# Patient Record
Sex: FEMALE | Race: WHITE | NOT HISPANIC OR LATINO | Employment: OTHER | URBAN - METROPOLITAN AREA
[De-identification: names, ages, dates, MRNs, and addresses within clinical notes are randomized per-mention and may not be internally consistent; named-entity substitution may affect disease eponyms.]

---

## 2017-06-22 ENCOUNTER — TRANSCRIBE ORDERS (OUTPATIENT)
Dept: SLEEP CENTER | Facility: CLINIC | Age: 72
End: 2017-06-22

## 2017-06-22 ENCOUNTER — HOSPITAL ENCOUNTER (OUTPATIENT)
Dept: SLEEP CENTER | Facility: CLINIC | Age: 72
Discharge: HOME | End: 2017-06-22
Payer: MEDICARE

## 2017-06-22 DIAGNOSIS — G47.33 OBSTRUCTIVE SLEEP APNEA (ADULT) (PEDIATRIC): ICD-10-CM

## 2017-06-22 DIAGNOSIS — G47.33 OBSTRUCTIVE SLEEP APNEA (ADULT) (PEDIATRIC): Primary | ICD-10-CM

## 2018-06-28 ENCOUNTER — OFFICE VISIT (OUTPATIENT)
Dept: SLEEP CENTER | Facility: CLINIC | Age: 73
End: 2018-06-28
Payer: MEDICARE

## 2018-06-28 ENCOUNTER — TELEPHONE (OUTPATIENT)
Dept: SLEEP CENTER | Facility: CLINIC | Age: 73
End: 2018-06-28

## 2018-06-28 VITALS — HEIGHT: 64 IN | BODY MASS INDEX: 39.95 KG/M2 | HEART RATE: 60 BPM | WEIGHT: 234 LBS

## 2018-06-28 DIAGNOSIS — E66.9 OBESITY (BMI 30-39.9): ICD-10-CM

## 2018-06-28 DIAGNOSIS — E03.9 HYPOTHYROIDISM (ACQUIRED): ICD-10-CM

## 2018-06-28 DIAGNOSIS — M79.89 LEG SWELLING: ICD-10-CM

## 2018-06-28 DIAGNOSIS — G47.33 OBSTRUCTIVE SLEEP APNEA: Primary | ICD-10-CM

## 2018-06-28 DIAGNOSIS — IMO0002 SLEEP-RELATED HYPOVENTILATION: ICD-10-CM

## 2018-06-28 PROCEDURE — 99214 OFFICE O/P EST MOD 30 MIN: CPT | Performed by: INTERNAL MEDICINE

## 2018-06-28 RX ORDER — HYDROCHLOROTHIAZIDE 25 MG/1
TABLET ORAL
COMMUNITY
Start: 2018-04-05

## 2018-06-28 RX ORDER — LEVOTHYROXINE SODIUM 88 UG/1
TABLET ORAL
COMMUNITY

## 2018-06-28 RX ORDER — HYALURONATE SODIUM 10 MG/ML
4 SYRINGE (ML) INTRAARTICULAR
COMMUNITY

## 2018-06-28 RX ORDER — FUROSEMIDE 20 MG/1
40 TABLET ORAL
COMMUNITY

## 2018-06-28 NOTE — PROGRESS NOTES
Review of Systems      Genitourinary post menopausal (no peroids)   Cardiology ankle/leg swelling   Gastrointestinal none   Neurology none   Constitutional none   Integumentary none   Psychiatry none   Musculoskeletal joint pain   Pulmonary none   ENT none   Endocrine none   Hematological none

## 2018-06-28 NOTE — PROGRESS NOTES
Follow-Up Note - Sleep Center   Pantera Monroe  74 y o  female  UHT:3/34/[birth year]243  YUX:[id number]    CC: I saw this patient for follow-up in clinic today for her Sleep Disordered Breathing, Coexisting Sleep and Medical Problems  PFSH, Problem List, Medications & Allergies were reviewed in EMR  Interval changes: [none reported ]   She  has no past medical history on file  She has a current medication list which includes the following prescription(s): b complex vitamins, vitamin d3, furosemide, hydrochlorothiazide, levothyroxine, multiple vitamins-minerals, and sodium hyaluronate  ROS: Reviewed (see attached)  Jennifer Griffin HPI:  With respect to sleep disordered breathing, compliance data download shows:  No data was available, but she reports regular use for > 4hours   Stephanie Kylah reports  · no  difficulty tolerating PAP;   · no  adverse effects:   · Benefitting from use: sleeping better     Sleep Routine:  She reports getting 8 hours; she  has no difficulty initiating or maintaining sleep   She awakens spontaneously feeling refreshed  She denies excessive drowsiness but would doze off in the evenings if sedentary  Sonja Wu rated herself at Total score: 5 /24 on the [place] sleepiness scale ]    Habits:[ reports that she has never smoked  She has never used smokeless tobacco ], [ reports that she does not drink alcohol ], [ reports that she does not use drugs  ], Caffeine use: moderate , Exercise routine:  She is unable to because of degenerative joint disease of the knees  EXAM: Pulse 60   Ht 5' 4" (1 626 m)   Wt 106 kg (234 lb)   BMI 40 17 kg/m²      Patient is alert, orientated, cooperative [and in no distress]  Mental state [appears normal]  Craniofacial anatomy normal  There are [no] facial pressure marks or rashes  Neck Circumference: 43 cm  There are no abnormal neck masses   Nasal airway is [patent ]  Mucous membranes appeared normal  The oral airway [is crowded ] Base of tongue is at Mallampati class IV (only hard palate visible)  [Apart from truncal obesity,] the rest of exam (Heart, Lungs, Abdomen, CNS and Musculoskeletal systems) was unremarkable   IMPRESSION:   1  Obstructive sleep apnea  Sleep F/U  - established patient    Cpap DME   2  Sleep-related hypoventilation     3  Obesity (BMI 30-39 9)     4  Leg swelling     5  Hypothyroidism (acquired)       PLAN:  1  I reviewed results of the Sleep studies with the patient:  Her diagnostic study in 2009 demonstrated an AHI of 54 per hour, higher during REM  Minimum oxygen saturation was 68%  During a subsequent therapeutic study, she required 13 cm H2 O to remediated sleep disordered breathing  2  Treatment with nasal CPAP is medically necessary and she is agreeable to continue use  3  The patient's current unit has now reached its five-year reasonable useful life and needs to be replaced  4  Instruction on care of equipment and strategies to improve comfort with use of PAP were discussed  5  Care coordinated with DME provider and Rx to replace supplies provided  6  Pressure setting:  Continue at 10-16 cm H2O     7  Compliance with therapy was emphasizedcand strategies for weight reduction discussed  8  With your consent, follow-up is advised in 2 months [to monitor progress, compliance and to adjust therapy]  Thank you for allowing me to participate in the care of this patient      Sincerely,    Authenticated electronically by Paul Montenegro MD on 72/58/45   Board Certified Specialist

## 2018-08-28 ENCOUNTER — OFFICE VISIT (OUTPATIENT)
Dept: SLEEP CENTER | Facility: CLINIC | Age: 73
End: 2018-08-28
Payer: MEDICARE

## 2018-08-28 VITALS — SYSTOLIC BLOOD PRESSURE: 120 MMHG | DIASTOLIC BLOOD PRESSURE: 90 MMHG | HEIGHT: 64 IN

## 2018-08-28 DIAGNOSIS — R53.83 OTHER FATIGUE: ICD-10-CM

## 2018-08-28 DIAGNOSIS — E66.9 OBESITY (BMI 30-39.9): ICD-10-CM

## 2018-08-28 DIAGNOSIS — M79.89 LEG SWELLING: ICD-10-CM

## 2018-08-28 DIAGNOSIS — G47.33 OBSTRUCTIVE SLEEP APNEA: Primary | ICD-10-CM

## 2018-08-28 DIAGNOSIS — G47.10 HYPERSOMNIA: ICD-10-CM

## 2018-08-28 DIAGNOSIS — IMO0002 SLEEP-RELATED HYPOVENTILATION: ICD-10-CM

## 2018-08-28 DIAGNOSIS — I10 ESSENTIAL HYPERTENSION: ICD-10-CM

## 2018-08-28 DIAGNOSIS — F51.12 INSUFFICIENT SLEEP SYNDROME: ICD-10-CM

## 2018-08-28 PROCEDURE — 99214 OFFICE O/P EST MOD 30 MIN: CPT | Performed by: INTERNAL MEDICINE

## 2018-08-28 NOTE — PROGRESS NOTES
Review of Systems      Genitourinary post menopausal (no peroids)   Cardiology ankle/leg swelling   Gastrointestinal none   Neurology none   Constitutional fatigue   Integumentary none   Psychiatry none   Musculoskeletal joint pain   Pulmonary none   ENT none   Endocrine none   Hematological none

## 2018-08-28 NOTE — PROGRESS NOTES
Follow-Up Note - Sleep Center   Pantera Monroe  74 y o  female  UMT:6/11/0791  [facility]:[id number]    CC: I saw this patient for follow-up in clinic today for her Sleep Disordered Breathing, Coexisting Sleep and Medical Problems  She got a replacement CPAP machine around 2 months ago  She reports increased fatigue  PFSH, Problem List, Medications & Allergies were reviewed in EMR  Interval changes: [none reported ]   She  has no past medical history on file  She has a current medication list which includes the following prescription(s): b complex vitamins, vitamin d3, furosemide, hydrochlorothiazide, levothyroxine, multiple vitamins-minerals, and sodium hyaluronate  ROS: Reviewed (see attached)  HPI:  With respect to compliance, data download shows in the past 5 nights (prior to that she was using her old machine):  using PAP > 4 hours/night 100% of the time  HARESH (estimated) 0 9/hour at [90th percentile] pressure of 11 8cm H2O  Stephanie Kylah reports  · no  difficulty tolerating PAP;   · no  adverse effects:   · Benefitting from use: sleeping better     Sleep Routine: She reports getting 6-1/2 to 7 hours sleep; she has no difficulty initiating or maintaining sleep   She awakens spontaneously feeling refreshed  She denied excessive drowsiness no but uses caffeine to stay alert and naps for approximately an hour before getting to bed and using CPAP  She rated herself at Total score: 3 /24 on the [place] sleepiness scale  Habits:[ reports that she has never smoked  She has never used smokeless tobacco ], [ reports that she does not drink alcohol ], [ reports that she does not use drugs  ], Caffeine use: moderate, Exercise routine: none  EXAM: /90   Ht 5' 4" (1 626 m)      Patient is alert, orientated, cooperative [and in no distress]  Mental state [appears normal]  Craniofacial anatomy normal  There are [no] facial pressure marks or rashes  Neck Circumference: 43 cm   There are no abnormal neck masses  Nasal airway is [patent ]  Mucous membranes appeared normal  The oral airway [is crowded ]  She has bilateral 2+ pedal edema  [Apart from truncal obesity,] the rest of exam (Heart, Lungs, Abdomen, CNS and Musculoskeletal systems) was unremarkable   IMPRESSION:   1  Obstructive sleep apnea  Cpap DME   2  Sleep-related hypoventilation     3  Insufficient sleep syndrome     4  Hypersomnia     5  Other fatigue     6  Obesity (BMI 30-39 9)     7  Leg swelling     8  Essential hypertension       PLAN:  1  Treatment with  PAP is medically necessary and Becca James is agreable to continue use  2  We discussed how to improve comfort using PAP, care of equipment, and importance of compliance with therapy  3  H2O Pressure settin-14 cm   4  Rx provided to replace supplies and Care coordinated with DME provider  5  Strategies for weight reduction were discussed  6  I also advised increasing the amount of sleep that she gets  7  Follow-up is advised in [1 Alberto Lyons [or sooner if needed] [to monitor progress, compliance and to adjust therapy]  Thank you for allowing me to participate in the care of this patient      Sincerely,    Authenticated electronically by Paul Montenegro MD on 15/09/60   Board Certified Specialist

## 2019-08-27 ENCOUNTER — OFFICE VISIT (OUTPATIENT)
Dept: SLEEP CENTER | Facility: CLINIC | Age: 74
End: 2019-08-27
Payer: MEDICARE

## 2019-08-27 VITALS
DIASTOLIC BLOOD PRESSURE: 80 MMHG | BODY MASS INDEX: 40.29 KG/M2 | SYSTOLIC BLOOD PRESSURE: 118 MMHG | HEIGHT: 64 IN | HEART RATE: 64 BPM | WEIGHT: 236 LBS

## 2019-08-27 DIAGNOSIS — E66.9 OBESITY (BMI 30-39.9): ICD-10-CM

## 2019-08-27 DIAGNOSIS — G47.33 OBSTRUCTIVE SLEEP APNEA: Primary | ICD-10-CM

## 2019-08-27 DIAGNOSIS — I10 ESSENTIAL HYPERTENSION: ICD-10-CM

## 2019-08-27 DIAGNOSIS — G47.10 HYPERSOMNIA: ICD-10-CM

## 2019-08-27 DIAGNOSIS — F51.12 INSUFFICIENT SLEEP SYNDROME: ICD-10-CM

## 2019-08-27 DIAGNOSIS — IMO0002 SLEEP-RELATED HYPOVENTILATION: ICD-10-CM

## 2019-08-27 PROCEDURE — 99214 OFFICE O/P EST MOD 30 MIN: CPT | Performed by: INTERNAL MEDICINE

## 2019-08-27 NOTE — PROGRESS NOTES
Review of Systems      Genitourinary post menopausal (no peroids)   Cardiology ankle/leg swelling   Gastrointestinal none   Neurology none   Constitutional none   Integumentary none   Psychiatry none   Musculoskeletal none   Pulmonary none   ENT none   Endocrine none   Hematological none

## 2019-08-27 NOTE — PATIENT INSTRUCTIONS

## 2019-08-27 NOTE — PROGRESS NOTES
Follow-Up Note - Sleep Center   Gómez Phillips  66 y o  female  SAF:1/11/[birth year]66  HAX:[id number]    CC: I saw this patient for follow-up in clinic today for her Sleep Disordered Breathing, Coexisting Sleep and Medical Problems  PFSH, Problem List, Medications & Allergies were reviewed in EMR  Interval changes: none reported  She  has no past medical history on file  She has a current medication list which includes the following prescription(s): b complex vitamins, vitamin d3, furosemide, hydrochlorothiazide, levothyroxine, multiple vitamins-minerals, and sodium hyaluronate  ROS: Reviewed (see attached)  DATA REVIEWED:  using PAP > 4 hours/night 100% of the time  Estimated HARESH 1 6/hour at pressure of 12 4cm H2O @90th percentile  SUBJECTIVE: Regarding use of PAP, Lilia Boyer reports:   · She is experiencing no  adverse effects:   · She is   benefiting from use: sleeping better   Sleep Routine: She reports getting 6-1/2 hrs sleep in bed and naps for approximately an hour on the counter; she has no difficulty initiating or maintaining sleep   She awakens spontaneously and feels refreshed  She denied excessive drowsiness   She rated herself at Total score: 2 /24 on the [place] sleepiness scale  Habits: reports that she has never smoked  She has never used smokeless tobacco ,  reports that she does not drink alcohol ,  reports that she does not use drugs  , Caffeine use: very little , Exercise routine: none in because of knee pain  OBJECTIVE: /80   Pulse 64   Ht 5' 4" (1 626 m)   Wt 107 kg (236 lb)   BMI 40 51 kg/m²    Constitutional: Patient is well groomed; well appearing  Skin/Extrem: warm & dry; col & hydration normal; no edema  Psych: cooperativeand in no distress  Mental State appears normal   CNS: Alert, orientated, clear & coherent speech  H&N: EOMI; strabismus; NC/AT:no facial pressure marks, no rashes  Neck Circumference: 17"  ENMT Mucus membranes normal Nasal airway:patent  Oral airway: crowded  Resp:effort is normal CVS: RRR ABD:truncal obesity MSK:Gait ambulant with a cane    ASSESSMENT: Primary Sleep/Secondary(to Medical or Psych conditions) & comorbidities   1  Obstructive sleep apnea     2  Sleep-related hypoventilation     3  Insufficient sleep syndrome     4  Hypersomnia     5  Obesity (BMI 30-39 9)     6  Essential hypertension       PLAN:  1  Treatment with  PAP is medically necessary and Jose Flannery is agreable to continue use  2  Care of equipment, methods to improve comfort using PAP and importance of compliance with therapy were discussed  3  Pressure setting: continue 11-14 cmH2O     4  Rx provided to replace supplies and Care coordinated with DME provider  5  Strategies for weight reduction were discussed  6  I advised an earlier bedtime, to avoid dozing off on the couch and to use CPAP during her entire sleep  7  Follow-up is advised in 1 year or sooner if needed to monitor progress, compliance and to adjust therapy  Thank you for allowing me to participate in the care of this patient      Sincerely,    Authenticated electronically by Parth Sinclair MD on 91/73/64   Board Certified Specialist

## 2019-09-06 ENCOUNTER — TELEPHONE (OUTPATIENT)
Dept: SLEEP CENTER | Facility: CLINIC | Age: 74
End: 2019-09-06

## 2020-09-22 ENCOUNTER — OFFICE VISIT (OUTPATIENT)
Dept: SLEEP CENTER | Facility: CLINIC | Age: 75
End: 2020-09-22
Payer: MEDICARE

## 2020-09-22 VITALS
OXYGEN SATURATION: 97 % | DIASTOLIC BLOOD PRESSURE: 78 MMHG | WEIGHT: 234 LBS | HEART RATE: 71 BPM | BODY MASS INDEX: 38.99 KG/M2 | HEIGHT: 65 IN | SYSTOLIC BLOOD PRESSURE: 130 MMHG

## 2020-09-22 DIAGNOSIS — G47.10 HYPERSOMNIA: ICD-10-CM

## 2020-09-22 DIAGNOSIS — IMO0002 SLEEP-RELATED HYPOVENTILATION: ICD-10-CM

## 2020-09-22 DIAGNOSIS — G47.33 OBSTRUCTIVE SLEEP APNEA: Primary | ICD-10-CM

## 2020-09-22 DIAGNOSIS — I10 ESSENTIAL HYPERTENSION: ICD-10-CM

## 2020-09-22 DIAGNOSIS — F51.12 INSUFFICIENT SLEEP SYNDROME: ICD-10-CM

## 2020-09-22 DIAGNOSIS — E66.9 OBESITY (BMI 30-39.9): ICD-10-CM

## 2020-09-22 PROCEDURE — 99214 OFFICE O/P EST MOD 30 MIN: CPT | Performed by: INTERNAL MEDICINE

## 2020-09-22 RX ORDER — METOLAZONE 5 MG/1
5 TABLET ORAL DAILY
COMMUNITY

## 2020-09-22 RX ORDER — DOXYCYCLINE 100 MG/1
100 CAPSULE ORAL DAILY
COMMUNITY
Start: 2020-09-15

## 2020-09-22 NOTE — PROGRESS NOTES
Review of Systems      Genitourinary none   Cardiology ankle/leg swelling   Gastrointestinal none   Neurology none   Constitutional none   Integumentary none   Psychiatry none   Musculoskeletal none   Pulmonary none   ENT none   Endocrine none   Hematological none

## 2020-09-22 NOTE — PROGRESS NOTES
Follow-Up Note - Sleep Center   Colin Calles  66 y o  female  UNW:9/99/3410  MELBA:[id number]    CC: I saw this patient for follow-up in clinic today for Sleep disordered breathing, Coexisting Sleep and Medical Problems  Results of prior studies: The diagnostic study in 2009 demonstrated an AHI of 54 per hour, higher during REM and while supine  Minimum oxygen saturation was 68%  During her last titration study in 2014, sleep disordered breathing was adequately remediated with nasal CPAP at 10 cm H2O  (Prior titration study demonstrated higher pressure requirement and machine was set in auto titrating mode )    PFSH, Problem List, Medications & Allergies were reviewed in EMR  Interval changes:  Recent cellulitis as a complication of venous insufficiency that cause swelling of her legs  Also new diagnosis of impaired glucose tolerance    She  has no past medical history on file  She has a current medication list which includes the following prescription(s): b complex vitamins, vitamin d3, doxycycline monohydrate, furosemide, levothyroxine, metformin, metolazone, multiple vitamins-minerals, hydrochlorothiazide, and sodium hyaluronate  ROS: constitutional, psychiatric, ENT, respiratory,CVS, GI, UGS, CNS, MSK, integumentary, endocrine, hematological reviewed - as attached  DATA REVIEWED:  using PAP > 4 hours/night 100% of the time  Estimated HARESH 0 8/hour at pressure of 11 3cm H2O @90th percentile  SUBJECTIVE: Regarding use of PAP, Emely Light reports:   · She is experiencing no  adverse effects:   · She is   benefiting from use: sleeping better and no longer snoring / having breathing difficulties   Sleep Routine: She reports getting 7-8 hrs sleep  ; she has no difficulty initiating or maintaining sleep   She awakens spontaneously and feels refreshed  She reports significantly improved drowsiness since getting more sleep   She is no longer having difficulty with driving and rated herself at Total score: 2 /24 on the [place] sleepiness scale  Habits: reports that she has never smoked  She has never used smokeless tobacco ,  reports no history of alcohol use ,  reports no history of drug use , Caffeine use: very little , Exercise routine: none   EXAM: /78   Pulse 71   Ht 5' 4 5" (1 638 m)   Wt 106 kg (234 lb)   SpO2 97%   BMI 39 55 kg/m²     Patient is well groomed; well appearing  Skin/Extrem: warm & dry; col & hydration normal; 2+ edema and wearing pressure stockings  Psych: cooperativeand in no distress  Mental state appears normal   CNS: Alert, orientated, clear & coherent speech  H&N: EOMI; NC/AT:no facial pressure marks, no rashes  Neck Circumference: 15 5 cm  ENMT Mucus membranes appear normal Nasal airway:patent  Oral airway:  crowded  Resp:effort is normal CVS: RRR ABD:truncal obesity MSK:Gait normal     IMPRESSION: Primary Sleep/Secondary(to Medical or Psych conditions) & comorbidities   1  Obstructive sleep apnea  PAP DME Resupply/Reorder   2  Sleep-related hypoventilation     3  Insufficient sleep syndrome      Improved   4  Hypersomnia      Improved   5  Essential hypertension     6  Obesity (BMI 30-39  9)         PLAN:  1  Treatment with  PAP is medically necessary and Meliton Paget is agreable to continue use  2  Care of equipment, methods to improve comfort using PAP and importance of compliance with therapy were discussed  3  Pressure setting: continue 10-16 cmH2O     4  Rx provided to replace supplies and Care coordinated with DME provider  5  Discussed  strategies for weight reduction  6  Follow-up is advised in 1 year or sooner if needed to monitor progress, compliance and to adjust therapy  Thank you for allowing me to participate in the care of this patient      Sincerely,    Authenticated electronically by Tiny Eaton MD on 03/59/76   Board Certified Specialist

## 2020-09-23 ENCOUNTER — TELEPHONE (OUTPATIENT)
Dept: SLEEP CENTER | Facility: CLINIC | Age: 75
End: 2020-09-23

## 2021-01-22 ENCOUNTER — IMMUNIZATIONS (OUTPATIENT)
Dept: FAMILY MEDICINE CLINIC | Facility: HOSPITAL | Age: 76
End: 2021-01-22

## 2021-01-22 DIAGNOSIS — Z23 ENCOUNTER FOR IMMUNIZATION: Primary | ICD-10-CM

## 2021-01-22 PROCEDURE — 91300 SARS-COV-2 / COVID-19 MRNA VACCINE (PFIZER-BIONTECH) 30 MCG: CPT

## 2021-01-22 PROCEDURE — 0001A SARS-COV-2 / COVID-19 MRNA VACCINE (PFIZER-BIONTECH) 30 MCG: CPT

## 2021-02-11 ENCOUNTER — IMMUNIZATIONS (OUTPATIENT)
Dept: FAMILY MEDICINE CLINIC | Facility: HOSPITAL | Age: 76
End: 2021-02-11

## 2021-02-11 DIAGNOSIS — Z23 ENCOUNTER FOR IMMUNIZATION: Primary | ICD-10-CM

## 2021-02-11 PROCEDURE — 0002A SARS-COV-2 / COVID-19 MRNA VACCINE (PFIZER-BIONTECH) 30 MCG: CPT | Performed by: PHYSICIAN ASSISTANT

## 2021-02-11 PROCEDURE — 91300 SARS-COV-2 / COVID-19 MRNA VACCINE (PFIZER-BIONTECH) 30 MCG: CPT | Performed by: PHYSICIAN ASSISTANT

## 2021-09-24 ENCOUNTER — OFFICE VISIT (OUTPATIENT)
Dept: SLEEP CENTER | Facility: CLINIC | Age: 76
End: 2021-09-24
Payer: MEDICARE

## 2021-09-24 VITALS
HEIGHT: 64 IN | BODY MASS INDEX: 39.4 KG/M2 | WEIGHT: 230.8 LBS | SYSTOLIC BLOOD PRESSURE: 148 MMHG | DIASTOLIC BLOOD PRESSURE: 78 MMHG

## 2021-09-24 DIAGNOSIS — E66.9 OBESITY (BMI 30-39.9): ICD-10-CM

## 2021-09-24 DIAGNOSIS — G47.33 OBSTRUCTIVE SLEEP APNEA: Primary | ICD-10-CM

## 2021-09-24 DIAGNOSIS — G47.10 HYPERSOMNIA: ICD-10-CM

## 2021-09-24 DIAGNOSIS — F51.12 INSUFFICIENT SLEEP SYNDROME: ICD-10-CM

## 2021-09-24 DIAGNOSIS — I10 ESSENTIAL HYPERTENSION: ICD-10-CM

## 2021-09-24 PROCEDURE — 99214 OFFICE O/P EST MOD 30 MIN: CPT | Performed by: INTERNAL MEDICINE

## 2021-09-24 NOTE — PROGRESS NOTES
Follow-Up Note - Sleep Center   Eva Scott  64 y o  female  SVO:6/99/6806  JKQ:[id number]  DOS:9/24/2021    CC: I saw this patient for follow-up in clinic today for Sleep disordered breathing, Coexisting Sleep and Medical Problems  She he is using a dream Station 1 machine that is over 1years old and just registered her machine with [employer] today  Results of prior studies: The diagnostic study in 2009 demonstrated an AHI of 54 per hour, higher during REM and while supine  Minimum oxygen saturation was 68%  During her last titration study in 2014, sleep disordered breathing was adequately remediated with nasal CPAP at 10 cm H2O  (Prior titration study demonstrated higher pressure requirement and machine was set in auto titrating mode )     PFSH, Problem List, Medications & Allergies were reviewed in EMR  Interval changes: none reported  She  has no past medical history on file  She has a current medication list which includes the following prescription(s): b complex vitamins, vitamin d3, furosemide, levothyroxine, metformin, multiple vitamins-minerals, doxycycline monohydrate, hydrochlorothiazide, metolazone, and sodium hyaluronate  PHYSIOLOGICAL DATA REVIEW AND INTERPRETATION:   using PAP > 4 hours/night 97%  Estimated HARESH 1 2/hour with pressure of 12 3cm H2O @90th percentile; Compliance: excellent; Sleep disordered breathing:stable & within target range; Patient has not been using ozone based or other devices to sanitize the machine  SUBJECTIVE: Regarding use of PAP, Jenny Prescott reports:   · She is experiencing no  adverse effects: ; has not noticed any fibres or foreign material in air line  · She is benefiting from use: sleeping better   Sleep Routine: Jenny Prescott reports getting 6 hrs sleep  ; she has no difficulty initiating or maintaining sleep   She arises spontaneously and feels refreshed  Jenny Prescott reports Excessive Daytime Sleepiness, dozes off when sedentary at home in the evening while watching TV for approximately an hour  She rated herself at Total score: 8 /24 on the [place] Sleepiness Scale  Habits: reports that she has never smoked  She has never used smokeless tobacco ,  reports no history of alcohol use ,  reports no history of drug use , Caffeine use: very little , Exercise routine: none   ROS: reviewed & as attached  Significant for few lb weight reduction since her last visit  She reported no nasal, respiratory or cardiac symptoms  Jerryl Rm EXAM: /78   Ht 5' 4" (1 626 m)   Wt 105 kg (230 lb 12 8 oz)   BMI 39 62 kg/m²     Patient is well groomed; well appearing  H&N: EOMI; NC/AT:no facial pressure marks, no rashes  Skin/Extrem: col & hydration normal; no edema  Psych: cooperativeand in no distress  Mental state:appears normal   Resp: Respiratory effort is normal  CNS: Alert, orientated, clear & coherent speech  Physical findings otherwise essentially unchanged from previous  IMPRESSION: Problem List Items & Comorbidities Addressed this Visit    1  Obstructive sleep apnea  PAP DME Resupply/Reorder   2  Insufficient sleep syndrome     3  Hypersomnia     4  Essential hypertension     5  Obesity (BMI 30-39  9)         PLAN:  I reviewed results of prior studies and physiologic data with the patient  Notified regarding recall of [employer] devices and their recommendation to discontinue use pending resolution of degradation of the foam    Risks of discontinuing PAP vs continuing use while awaiting resolution of the recall were explained and patient indicates understanding  I discussed treatment options with risks and benefits  Procedure for registering machine with [employer] provided  & instructed patients who have, to track progress/updates on [facility]in [facility]Cris  Patient elected to continue using Pap while awaiting remediation  Care of equipment, methods to improve comfort using PAP and importance of compliance with therapy were discussed    Instructed not to use ozone based or other unapproved  cleaning devices  Pressure setting: 10-16 cmH2O  Rx provided to replace  supplies and Care coordinated with DME provider  Discussed strategies for weight reduction  Follow-up is advised in 1 year or sooner if needed to monitor progress, compliance and to adjust therapy  Thank you for allowing me to participate in the care of this patient  Sincerely,    Authenticated electronically by Digna Nuñez MD on 67/83/27   Board Certified Specialist     Portions of the record may have been created with voice recognition software  Occasional wrong word or "sound a like" substitutions may have occurred due to the inherent limitations of voice recognition software  There may also be notations and random deletions of words or characters from malfunctioning software  Read the chart carefully and recognize, using context, where substitutions/deletions have occurred

## 2021-09-24 NOTE — PATIENT INSTRUCTIONS
Continuous Positive Airway Pressure (CPAP) therapy was prescribed to you as a medical necessity and there are risks of discontinuing  use of the device, some of which may be long term  Symptoms you experienced before using CPAP may return such as snoring, apneas, excessive daytime sleepiness, hypertension, cardiac arrhythmias, risk of stroke, congestive heart-failure, exacerbation of COPD and potential respiratory failure  Ultimately, it is a personal decision for you to make if you continue use of an affected device or discontinue until a replacement is provided  Unfortunately, [employer] has provided us with limited information about available devices  However, recently some patients who registered the machines early on, already received replacement  You can visit their website at [email or web address] to register your device or www  [email or web address]  [email or web address]  [email or web address] to learn more about how [facility] to replace your device  You can also try calling 9 [phone]  Another option would be to check with your medical equipment provider to determine if you are eligible for a new machine through your insurance, if not you can pay out of pocket for a new machine  According to [facility], an in line bacterial filter is not recommended for use with CPAP/BiPAP  If you wish to get a replacement machine, we are able to provide you with a script  Please include your mask type  Nursing Support:  When: Monday through Friday 7A-5PM except holidays  Where: Our direct line is [phone]  If you are having a true emergency please call 911  In the event that the line is busy or it is after hours please leave a voice message and we will return your call  Please speak clearly, leaving your full name, birth date, best number to reach you and the reason for your call  Medication refills:  We will need the name of the medication, the dosage, the ordering provider, whether you get a 30 or 90 day refill, and the pharmacy name and address  Medications will be ordered by the provider only  Nurses cannot call in prescriptions  Please allow 7 days for medication refills  Physician requested updates: If your provider requested that you call with an update after starting medication, please be ready to provide us the medication and dosage, what time you take your medication, the time you attempt to fall asleep, time you fall asleep, when you wake up, and what time you get out of bed  Sleep Study Results: We will contact you with sleep study results and/or next steps after the physician has reviewed your testing

## 2021-09-27 ENCOUNTER — TELEPHONE (OUTPATIENT)
Dept: SLEEP CENTER | Facility: CLINIC | Age: 76
End: 2021-09-27

## 2022-09-30 ENCOUNTER — OFFICE VISIT (OUTPATIENT)
Dept: SLEEP CENTER | Facility: CLINIC | Age: 77
End: 2022-09-30
Payer: COMMERCIAL

## 2022-09-30 VITALS
WEIGHT: 230.4 LBS | HEART RATE: 72 BPM | SYSTOLIC BLOOD PRESSURE: 126 MMHG | DIASTOLIC BLOOD PRESSURE: 63 MMHG | BODY MASS INDEX: 39.55 KG/M2

## 2022-09-30 DIAGNOSIS — G47.10 HYPERSOMNIA: ICD-10-CM

## 2022-09-30 DIAGNOSIS — F51.12 INSUFFICIENT SLEEP SYNDROME: ICD-10-CM

## 2022-09-30 DIAGNOSIS — E66.9 OBESITY (BMI 30-39.9): ICD-10-CM

## 2022-09-30 DIAGNOSIS — G47.33 OBSTRUCTIVE SLEEP APNEA: Primary | ICD-10-CM

## 2022-09-30 DIAGNOSIS — I10 ESSENTIAL HYPERTENSION: ICD-10-CM

## 2022-09-30 PROCEDURE — 99214 OFFICE O/P EST MOD 30 MIN: CPT | Performed by: INTERNAL MEDICINE

## 2022-09-30 NOTE — PATIENT INSTRUCTIONS

## 2022-09-30 NOTE — PROGRESS NOTES
Follow-Up Note - Sleep Center   Wilbur Ellsworth  76 y o  female  :1945  VGF:[id number]  DOS:2022    CC: I saw this patient for follow-up in clinic today for Sleep disordered breathing, Coexisting Sleep and Medical Problems  She is using a dream Station 1 machine and has registered with [facility], Problem List, Medications & Allergies were reviewed in EMR  Interval changes: none reported  She  has no past medical history on file  She has a current medication list which includes the following prescription(s): b complex vitamins, vitamin d3, furosemide, levothyroxine, metformin, multiple vitamins-minerals, doxycycline monohydrate, hydrochlorothiazide, metolazone, and sodium hyaluronate  PHYSIOLOGICAL DATA REVIEW AND INTERPRETATION:    using PAP > 4 hours/night 67%  Data shows machine was not used for  days  She acknowledges having "missed a few days here and there" because at times she dozes off before applying CPAP  Estimated HARESH 1 5/hour with pressure of 11 5cm H2O @90th/95th percentile; Patient has not been using ozone based devices to sanitize the machine  SUBJECTIVE: Regarding use of PAP, Ramin Benz reports:   · no adverse effects:  has not noticed any fibres or foreign material in air line  · She is benefiting from use: sleeping better   Sleep Routine: Ramin Benz reports getting >6 hrs sleep  ; she has no difficulty initiating or maintaining sleep   She arises spontaneously and feels refreshed  Ramin Benz denies Excessive Daytime Sleepiness, dozes off when sedentary at home  She rated herself at Total score: 7 /24 on the [place] Sleepiness Scale  Habits: reports that she has never smoked  She has never used smokeless tobacco ,  reports no history of alcohol use ,  reports no history of drug use , Caffeine use:rarely ; Exercise routine: none   ROS: reviewed & as attached  Significant for weight has been stable  She reported no nasal, respiratory or cardiac symptoms    She is on Lasix that she takes at night  Jean-Pierre Bernstein EXAM: /63 (BP Location: Left arm, Patient Position: Sitting)   Pulse 72   Wt 105 kg (230 lb 6 4 oz)   BMI 39 55 kg/m²     Wt Readings from Last 3 Encounters:   09/30/22 105 kg (230 lb 6 4 oz)   09/24/21 105 kg (230 lb 12 8 oz)   09/22/20 106 kg (234 lb)      Patient is well groomed; well appearing  Hard of hearing  CNS: Alert, orientated, clear & coherent speech  Psych: cooperativeand in no distress  Mental state:appears normal   H&N: EOMI; NC/AT:no facial pressure marks, no rashes  Skin/Extrem: col & hydration normal; no edema  Resp: Respiratory effort is normal  Physical findings otherwise essentially unchanged from previous  IMPRESSION: Problem List Items & Comorbidities Addressed this Visit    1  Obstructive sleep apnea  PAP DME Resupply/Reorder   2  Hypersomnia     3  Insufficient sleep syndrome     4  Essential hypertension     5  Obesity (BMI 30-39  9)         PLAN:  1  I reviewed results of prior studies and physiologic data with the patient  2  I discussed treatment options with risks and benefits  3  Treatment with  PAP is medically necessary and Sebastian Gallego is agreable to continue use while awaiting replacement from Lorre Shima  4  Care of equipment, methods to improve comfort using PAP and importance of compliance with therapy were discussed  5  Pressure setting: continue 10-16 cmH2O     6  Rx provided to replace  supplies and Care coordinated with DME provider  7  Discussed strategies for weight reduction  8  Consideration to be given to timing of her Lasix to facilitate better and longer sleep that would improve her drowsiness     9  Follow-up is advised in 1 year or sooner if needed to monitor progress, compliance and to adjust therapy  Thank you for allowing me to participate in the care of this patient      Sincerely,     Authenticated electronically on 79/49/60   Board Certified Specialist     Portions of the record may have been created with voice recognition software  Occasional wrong word or "sound a like" substitutions may have occurred due to the inherent limitations of voice recognition software  There may also be notations and random deletions of words or characters from malfunctioning software  Read the chart carefully and recognize, using context, where substitutions/deletions have occurred

## 2022-10-03 ENCOUNTER — TELEPHONE (OUTPATIENT)
Dept: SLEEP CENTER | Facility: CLINIC | Age: 77
End: 2022-10-03

## 2023-09-15 RX ORDER — SODIUM CHLORIDE 9 MG/ML
125 INJECTION, SOLUTION INTRAVENOUS CONTINUOUS
Status: CANCELLED | OUTPATIENT
Start: 2023-09-15

## 2023-09-18 ENCOUNTER — HOSPITAL ENCOUNTER (OUTPATIENT)
Dept: GASTROENTEROLOGY | Facility: HOSPITAL | Age: 78
Setting detail: OUTPATIENT SURGERY
Discharge: HOME | End: 2023-09-18
Attending: COLON & RECTAL SURGERY | Admitting: COLON & RECTAL SURGERY
Payer: MEDICARE

## 2023-09-18 ENCOUNTER — ANESTHESIA (OUTPATIENT)
Dept: GASTROENTEROLOGY | Facility: HOSPITAL | Age: 78
End: 2023-09-18

## 2023-09-18 ENCOUNTER — ANESTHESIA EVENT (OUTPATIENT)
Dept: GASTROENTEROLOGY | Facility: HOSPITAL | Age: 78
End: 2023-09-18

## 2023-09-18 VITALS
WEIGHT: 210 LBS | OXYGEN SATURATION: 100 % | RESPIRATION RATE: 16 BRPM | HEART RATE: 70 BPM | DIASTOLIC BLOOD PRESSURE: 58 MMHG | HEIGHT: 64 IN | TEMPERATURE: 97.5 F | BODY MASS INDEX: 35.85 KG/M2 | SYSTOLIC BLOOD PRESSURE: 117 MMHG

## 2023-09-18 DIAGNOSIS — Z80.0 FAMILY HISTORY OF MALIGNANT NEOPLASM OF DIGESTIVE ORGANS: ICD-10-CM

## 2023-09-18 DIAGNOSIS — Z12.11 ENCOUNTER FOR SCREENING FOR MALIGNANT NEOPLASM OF COLON: ICD-10-CM

## 2023-09-18 PROBLEM — E66.812 CLASS 2 OBESITY DUE TO EXCESS CALORIES WITHOUT SERIOUS COMORBIDITY IN ADULT: Status: ACTIVE | Noted: 2023-09-18

## 2023-09-18 PROBLEM — E66.09 CLASS 2 OBESITY DUE TO EXCESS CALORIES WITHOUT SERIOUS COMORBIDITY IN ADULT: Status: ACTIVE | Noted: 2023-09-18

## 2023-09-18 LAB — GLUCOSE SERPL-MCNC: 120 MG/DL (ref 65–140)

## 2023-09-18 PROCEDURE — 88305 TISSUE EXAM BY PATHOLOGIST: CPT | Performed by: PATHOLOGY

## 2023-09-18 PROCEDURE — 82948 REAGENT STRIP/BLOOD GLUCOSE: CPT

## 2023-09-18 RX ORDER — PROPOFOL 10 MG/ML
INJECTION, EMULSION INTRAVENOUS AS NEEDED
Status: DISCONTINUED | OUTPATIENT
Start: 2023-09-18 | End: 2023-09-18

## 2023-09-18 RX ORDER — SODIUM CHLORIDE 9 MG/ML
125 INJECTION, SOLUTION INTRAVENOUS CONTINUOUS
Status: DISCONTINUED | OUTPATIENT
Start: 2023-09-18 | End: 2023-09-22 | Stop reason: HOSPADM

## 2023-09-18 RX ORDER — LIDOCAINE HYDROCHLORIDE 20 MG/ML
INJECTION, SOLUTION EPIDURAL; INFILTRATION; INTRACAUDAL; PERINEURAL AS NEEDED
Status: DISCONTINUED | OUTPATIENT
Start: 2023-09-18 | End: 2023-09-18

## 2023-09-18 RX ADMIN — PROPOFOL 50 MG: 10 INJECTION, EMULSION INTRAVENOUS at 11:30

## 2023-09-18 RX ADMIN — LIDOCAINE HYDROCHLORIDE 60 MG: 20 INJECTION, SOLUTION EPIDURAL; INFILTRATION; INTRACAUDAL at 11:06

## 2023-09-18 RX ADMIN — PROPOFOL 50 MG: 10 INJECTION, EMULSION INTRAVENOUS at 11:08

## 2023-09-18 RX ADMIN — SODIUM CHLORIDE: 0.9 INJECTION, SOLUTION INTRAVENOUS at 11:03

## 2023-09-18 RX ADMIN — PROPOFOL 50 MG: 10 INJECTION, EMULSION INTRAVENOUS at 11:10

## 2023-09-18 RX ADMIN — PROPOFOL 50 MG: 10 INJECTION, EMULSION INTRAVENOUS at 11:18

## 2023-09-18 RX ADMIN — PROPOFOL 100 MG: 10 INJECTION, EMULSION INTRAVENOUS at 11:06

## 2023-09-18 RX ADMIN — PROPOFOL 50 MG: 10 INJECTION, EMULSION INTRAVENOUS at 11:16

## 2023-09-18 NOTE — INTERVAL H&P NOTE
H&P reviewed. After examining the patient I find no changes in the patients condition since the H&P had been written.     Vitals:    09/18/23 1012   BP: 160/75   Pulse: 78   Resp: 16   Temp: 97.5 °F (36.4 °C)   SpO2: 99%

## 2023-09-18 NOTE — ANESTHESIA POSTPROCEDURE EVALUATION
Post-Op Assessment Note    CV Status:  Stable    Pain management: adequate     Mental Status:  Alert and awake   Hydration Status:  Euvolemic   PONV Controlled:  Controlled   Airway Patency:  Patent      Post Op Vitals Reviewed: Yes      Staff: Anesthesiologist         No notable events documented.     BP      Temp      Pulse     Resp      SpO2      /58   Pulse 70   Temp 97.5 °F (36.4 °C) (Temporal)   Resp 16   Ht 5' 4" (1.626 m)   Wt 95.3 kg (210 lb)   LMP  (LMP Unknown) Comment: postmenopausal  SpO2 100%   BMI 36.05 kg/m²

## 2023-09-18 NOTE — ANESTHESIA PREPROCEDURE EVALUATION
Procedure:  COLONOSCOPY    Relevant Problems   ENDO   (+) Hypothyroidism (acquired)      PULMONARY   (+) Obstructive sleep apnea      Other   (+) Class 2 obesity due to excess calories without serious comorbidity in adult   (+) Hypersomnia   (+) Leg swelling   (+) Obesity (BMI 30-39. 9)        Physical Exam    Airway    Mallampati score: II  TM Distance: >3 FB  Neck ROM: full     Dental   No notable dental hx     Cardiovascular  Rhythm: regular, Rate: normal, Cardiovascular exam normal    Pulmonary  Pulmonary exam normal Breath sounds clear to auscultation,     Other Findings        Anesthesia Plan  ASA Score- 2     Anesthesia Type- IV sedation with anesthesia with ASA Monitors. Additional Monitors:   Airway Plan:     Comment: GA prn  -------------------------------------------------------------------------------------------------------------------------------------------------------  Says she was told she has "a very narrow throat" w/r to ETT. .       Plan Factors-    Patient summary reviewed. Patient is not a current smoker. Patient not instructed to abstain from smoking on day of procedure. Patient did not smoke on day of surgery. There is medical exclusion for perioperative obstructive sleep apnea risk education. Induction- intravenous. Postoperative Plan-     Informed Consent- Anesthetic plan and risks discussed with patient.

## 2023-09-18 NOTE — H&P
[facility]  HISTORY AND PHYSICAL EXAM  Morales Arita 78 y.o. female MRN: [id number]  Unit/Bed#:  Encounter: [id number]    Assessment/Plan     Indication for colonoscopy: Screening colonoscopy    Plan:  Flexible Colonoscopy    Review of Systems    Historical Information   History reviewed. No pertinent past medical history. Past Surgical History:   Procedure Laterality Date   • JOINT REPLACEMENT Bilateral     knee     Social History   Social History     Substance and Sexual Activity   Alcohol Use No     Social History     Substance and Sexual Activity   Drug Use No     Social History     Tobacco Use   Smoking Status Never   Smokeless Tobacco Never     Family History: History reviewed. No pertinent family history. Meds/Allergies   PTA meds:   Cannot display prior to admission medications because the patient has not been admitted in this contact. Allergies   Allergen Reactions   • Oxycodone-Acetaminophen      Other reaction(s): Other (See Comments)  Nausea   • Sulfa Antibiotics Rash       Objective   First Vitals:   Blood Pressure: 160/75 (09/18/23 1012)  Pulse: 78 (09/18/23 1012)  Temperature: 97.5 °F (36.4 °C) (09/18/23 1012)  Temp Source: Temporal (09/18/23 1012)  Respirations: 16 (09/18/23 1012)  Height: 5' 4" (162.6 cm) (09/18/23 1012)  Weight - Scale: 95.3 kg (210 lb) (09/18/23 1012)  SpO2: 99 % (09/18/23 1012)    Current Vitals:   Blood Pressure: 160/75 (09/18/23 1012)  Pulse: 78 (09/18/23 1012)  Temperature: 97.5 °F (36.4 °C) (09/18/23 1012)  Temp Source: Temporal (09/18/23 1012)  Respirations: 16 (09/18/23 1012)  Height: 5' 4" (162.6 cm) (09/18/23 1012)  Weight - Scale: 95.3 kg (210 lb) (09/18/23 1012)  SpO2: 99 % (09/18/23 1012)    No intake or output data in the 24 hours ending 09/18/23 1057    Invasive Devices     Peripheral Intravenous Line  Duration           Peripheral IV 09/18/23 Dorsal (posterior); Left Hand <1 day                Physical Exam    HEENT: Normocephalic, atraumatic  Lungs: Clear  Heart: Regular rate and rhythm  Abdomen: Soft. Nondistended.  Nontender  Extremities: No edema    Lab Results: CBC: No results found for: "WBC", "HGB", "HCT", "MCV", "PLT", "ADJUSTEDWBC", "RBC", "MCH", "MCHC", "RDW", "MPV", "NRBC"

## 2023-09-21 PROCEDURE — 88305 TISSUE EXAM BY PATHOLOGIST: CPT | Performed by: PATHOLOGY

## 2023-10-06 ENCOUNTER — OFFICE VISIT (OUTPATIENT)
Dept: SLEEP CENTER | Facility: CLINIC | Age: 78
End: 2023-10-06
Payer: COMMERCIAL

## 2023-10-06 VITALS
BODY MASS INDEX: 35.85 KG/M2 | WEIGHT: 210 LBS | HEIGHT: 64 IN | DIASTOLIC BLOOD PRESSURE: 83 MMHG | SYSTOLIC BLOOD PRESSURE: 132 MMHG

## 2023-10-06 DIAGNOSIS — I10 ESSENTIAL HYPERTENSION: ICD-10-CM

## 2023-10-06 DIAGNOSIS — F51.12 INSUFFICIENT SLEEP SYNDROME: ICD-10-CM

## 2023-10-06 DIAGNOSIS — E66.9 OBESITY (BMI 30-39.9): ICD-10-CM

## 2023-10-06 DIAGNOSIS — Z91.199 POOR COMPLIANCE WITH CPAP TREATMENT: ICD-10-CM

## 2023-10-06 DIAGNOSIS — F43.21 ADJUSTMENT DISORDER WITH DEPRESSED MOOD: ICD-10-CM

## 2023-10-06 DIAGNOSIS — G47.33 OBSTRUCTIVE SLEEP APNEA: Primary | ICD-10-CM

## 2023-10-06 PROCEDURE — 99214 OFFICE O/P EST MOD 30 MIN: CPT | Performed by: INTERNAL MEDICINE

## 2023-10-06 RX ORDER — LISINOPRIL 5 MG/1
5 TABLET ORAL DAILY
COMMUNITY
Start: 2023-07-05

## 2023-10-06 NOTE — PROGRESS NOTES
Follow-Up Note - Sleep Center   Melia Garcia  78 y.o. female  VAD:3[birth year]  BJ:[id number]  DOS:10/6/2023    CC: I saw this patient for follow-up in clinic today for Sleep disordered breathing, Coexisting Sleep and Medical Problems. Interval changes: Patient received ot a refurbished dream Station Version 1 machine from [place] several months ago. Results of prior studies: The diagnostic study in 2009 demonstrated an AHI of 54 per hour, higher during REM and while supine. Minimum oxygen saturation was 68%. During her last titration study in 2014, sleep disordered breathing was adequately remediated with nasal CPAP at 10 cm H2O. (Prior titration study demonstrated higher pressure requirement and machine was set in auto titrating mode.)      PFSH, Problem List, Medications & Allergies were reviewed in EMR. She  has no past medical history on file. She has a current medication list which includes the following prescription(s): b complex vitamins, vitamin d3, co q 10, levothyroxine, metformin, multiple vitamins-minerals, omega-3 fatty acids, furosemide, lisinopril, and sertraline. PHYSIOLOGICAL DATA REVIEW : Device has been used 20/30 days and average on days used is 5 hours and 30 minutes. She frequently falls asleep on the couch and does not put on CPAP.   using PAP > 4 hours/night 60%. Estimated HARESH 2.7/hour with pressure of 11.4cm [employer]icki[employer] percentile; patient has not been using non FDA approved devices to sanitize the machine. INTERPRETATION: Compliance is fair; Pressure setting is:within target range; ;   SUBJECTIVE: With respect to use of PAP, Kolton Lilly  is experiencing no adverse effects:. She derives benefit and feels more rested when using CPAP. Sleep Routine: Kolton Lilly reports getting 6 hrs sleep; she has no difficulty initiating or maintaining sleep . She arises spontaneously and is not always refreshed. Kolton Lilly denies daytime sleepiness,.  She rated [herself] at Total score: 7 /24 on the [place] Sleepiness Scale. Other issues: None reported. Habits:[ reports that she has never smoked. She has never used smokeless tobacco.], [ reports no history of alcohol use.], [ reports no history of drug use.], Caffeine use:moderate; Exercise routine: none. ROS: Significant for around 20 pounds weight loss because of poor appetite since she lost a close friend. She was experiencing depression and tried Zoloft but discontinued use because it affected her blood pressure. She is feeling a lot better and appetite has improved. A 10 point review of systems was otherwise negative. Tiffanie Clayton EXAM: Ht 5' 4" (1.626 m)   Wt 95.3 kg (210 lb)   LMP  (LMP Unknown) Comment: postmenopausal  BMI 36.05 kg/m²     Wt Readings from Last 3 Encounters:   10/06/23 95.3 kg (210 lb)   09/18/23 95.3 kg (210 lb)   09/30/22 105 kg (230 lb 6.4 oz)      Patient is well groomed; well appearing. CNS: Alert, orientated, speech clear & coherent  Psych: cooperative and in no distress. Mental state: Appears normal.  H&N: EOMI; NC/AT: No facial pressure marks, no rashes. Skin/Extrem: col & hydration normal; no edema  Resp: Respiratory effort is normal  Physical findings otherwise essentially unchanged from previous. IMPRESSION: Problem List Items & Comorbidities Addressed this Visit    1. Obstructive sleep apnea  PAP DME Resupply/Reorder      2. Poor compliance with CPAP treatment        3. Insufficient sleep syndrome        4. Adjustment disorder with depressed mood        5. Essential hypertension        6. Obesity (BMI 30-39. 9)            PLAN:  1. I reviewed results of prior studies and physiologic data with the patient. 2. I discussed treatment options with risks and benefits. 3. Treatment with  PAP is medically necessary and Clem Bolton is agreable to continue use. 4. Care of equipment, methods to improve comfort using PAP and importance of compliance with therapy were discussed.   5. Pressure setting: continue 10-16 cmH2O.    6. Rx provided to replace supplies and Care coordinated with DME provider. 7. Discussed strategies for weight reduction. 8. She feels she is doing better and does not need antidepressant medication. 9. I also advised allowing sufficient opportunity for sleep. 10. Follow-up is advised in 1 year or sooner if needed to monitor progress, compliance and to adjust therapy. Thank you for allowing me to participate in the care of this patient. Sincerely,     Authenticated electronically on 68/23/98   Board Certified Specialist     Portions of the record may have been created with voice recognition software. Occasional wrong word or "sound a like" substitutions may have occurred due to the inherent limitations of voice recognition software. There may also be notations and random deletions of words or characters from malfunctioning software. Read the chart carefully and recognize, using context, where substitutions/deletions have occurred.

## 2023-10-06 NOTE — PATIENT INSTRUCTIONS

## 2023-10-09 ENCOUNTER — TELEPHONE (OUTPATIENT)
Dept: SLEEP CENTER | Facility: CLINIC | Age: 78
End: 2023-10-09

## 2023-10-10 LAB

## 2024-10-04 ENCOUNTER — OFFICE VISIT (OUTPATIENT)
Dept: SLEEP CENTER | Facility: CLINIC | Age: 79
End: 2024-10-04
Payer: COMMERCIAL

## 2024-10-04 VITALS
HEART RATE: 74 BPM | WEIGHT: 215 LBS | BODY MASS INDEX: 36.7 KG/M2 | SYSTOLIC BLOOD PRESSURE: 146 MMHG | HEIGHT: 64 IN | DIASTOLIC BLOOD PRESSURE: 68 MMHG

## 2024-10-04 DIAGNOSIS — G47.33 OBSTRUCTIVE SLEEP APNEA: Primary | ICD-10-CM

## 2024-10-04 DIAGNOSIS — Z91.199 POOR COMPLIANCE WITH CPAP TREATMENT: ICD-10-CM

## 2024-10-04 DIAGNOSIS — F51.12 INSUFFICIENT SLEEP SYNDROME: ICD-10-CM

## 2024-10-04 DIAGNOSIS — E66.9 OBESITY (BMI 30-39.9): ICD-10-CM

## 2024-10-04 DIAGNOSIS — F43.21 ADJUSTMENT DISORDER WITH DEPRESSED MOOD: ICD-10-CM

## 2024-10-04 DIAGNOSIS — I10 ESSENTIAL HYPERTENSION: ICD-10-CM

## 2024-10-04 PROCEDURE — G2211 COMPLEX E/M VISIT ADD ON: HCPCS | Performed by: INTERNAL MEDICINE

## 2024-10-04 PROCEDURE — 99214 OFFICE O/P EST MOD 30 MIN: CPT | Performed by: INTERNAL MEDICINE

## 2024-10-04 RX ORDER — EMPAGLIFLOZIN 10 MG/1
10 TABLET, FILM COATED ORAL EVERY MORNING
COMMUNITY

## 2024-10-04 NOTE — PROGRESS NOTES
"  Follow-Up Note - Sleep Center   Renée Smiley  79 y.o. female  :1945  MRN:[id number]  DOS:10/4/2024    CC: I saw this patient for follow-up in clinic today for Sleep disordered breathing, Coexisting Sleep and Medical Problems.  Patient received ot a refurbished dream Station Version 1 machine from [place] over a year ago.. Interval changes: She is inquiring about inspire procedure.     Results of prior studies: The diagnostic study in  demonstrated an AHI of 54 per hour, higher during REM and while supine.  Minimum oxygen saturation was 68%.  During her last titration study in , sleep disordered breathing was adequately remediated with nasal CPAP at 10 cm H2O.  (Prior titration study demonstrated higher pressure requirement and machine was set in auto titrating mode.)      PFSH, Problem List, Medications & Allergies were reviewed in EMR.   She  has no past medical history on file.    She has a current medication list which includes the following prescription(s): b complex vitamins, vitamin d3, co q 10, furosemide, jardiance, levothyroxine, multiple vitamins-minerals, omega-3 fatty acids, lisinopril, metformin, and sertraline.    PHYSIOLOGICAL DATA REVIEW : Device has been used 21/30 days and average on days used is 4 hours and 7 minutes using PAP > 4 hours/night 40%. Estimated HARESH 1.5/hour with pressure of 11.7cm H2O@90th/95th percentile; use is limited by \"I fall asleep on the couch \"..  INTERPRETATION: Compliance is poor; Pressure setting is:optimal; ;   SUBJECTIVE: With respect to use of PAP, Renée  is experiencing no adverse effects:.She derives benefit..  Feels satisfied with sleep and daytime function.   Sleep Routine: Renée reports getting 6 hrs sleep; she has no difficulty initiating or maintaining sleep . She arises spontaneously and usually feels refreshed \"when I use the machine otherwise I am more sleepy and tired \"..Renée]reports episodic daytime sleepiness, dozes off when " "sedentary at home \"when I do not use it \"..  She rated herself at Total score: 0 /24 on the [place] Sleepiness Scale.   Other issues: None reported.     Habits: Reports that she has never smoked. She has never used smokeless tobacco.,  Reports no history of alcohol use.,  Reports no history of drug use., Caffeine use:very little until  ; Exercise routine: none being limited by swelling of her legs due to venous insufficiency.      ROS: Significant for few pounds weight gain.  She is reporting no nasal, respiratory or cardiac symptoms.  She was experiencing loss of appetite when she lost her friend but mood and appetite has now improved.    EXAM: /68 (BP Location: Left arm, Patient Position: Sitting, Cuff Size: Large)   Pulse 74   Ht 5' 4\" (1.626 m)   Wt 97.5 kg (215 lb)   BMI 36.90 kg/m²     Wt Readings from Last 3 Encounters:   10/04/24 97.5 kg (215 lb)   10/06/23 95.3 kg (210 lb)   09/18/23 95.3 kg (210 lb)      Patient is well groomed; well appearing.   CNS: Alert, orientated, speech clear & coherent  Psych: cooperative and in no distress. Mental state: Appears normal.  H&N: EOMI; NC/AT: No facial pressure marks, no rashes.    Skin/Extrem: col & hydration normal; no edema  Resp: Respiratory effort is normal  Physical findings otherwise essentially unchanged from previous.    IMPRESSION: Problem List Items & Comorbidities Addressed this Visit    1. Obstructive sleep apnea  PAP DME Resupply/Reorder      2. Poor compliance with CPAP treatment        3. Insufficient sleep syndrome        4. Adjustment disorder with depressed mood        5. Essential hypertension        6. Obesity (BMI 30-39.9)            PLAN:  I reviewed results of prior studies and physiologic data with the patient.   I discussed treatment options with risks and benefits.  She does not qualify for inspire because of her BMI and she is tolerating CPAP.  Treatment with  PAP is medically necessary and Renée is agreable to continue use. " "  Care of equipment, methods to improve comfort using PAP and importance of compliance with therapy were discussed.  Pressure setting: continue 10-16 cmH2O using a nasal interface.    Rx provided to replace supplies and Care coordinated with DME provider.   Discussed strategies for weight reduction.    I also advised allowing sufficient opportunity for sleep targeting upwards of 7 hours.  Follow-up is advised in 1 year or sooner if needed to monitor progress, compliance and to adjust therapy.     Sincerely,     Authenticated electronically on 10/04/24   Board Certified Specialist     Portions of the record may have been created with voice recognition software. Occasional wrong word or \"sound a like\" substitutions may have occurred due to the inherent limitations of voice recognition software. There may also be notations and random deletions of words or characters from malfunctioning software. Read the chart carefully and recognize, using context, where substitutions/deletions have occurred.    "

## 2024-10-05 ENCOUNTER — TELEPHONE (OUTPATIENT)
Dept: SLEEP CENTER | Facility: CLINIC | Age: 79
End: 2024-10-05

## 2024-10-05 LAB
DME PARACHUTE DELIVERY DATE REQUESTED: NORMAL
DME PARACHUTE ITEM DESCRIPTION: NORMAL
DME PARACHUTE ORDER STATUS: NORMAL
DME PARACHUTE SUPPLIER NAME: NORMAL
DME PARACHUTE SUPPLIER PHONE: NORMAL
